# Patient Record
Sex: FEMALE | Race: WHITE
[De-identification: names, ages, dates, MRNs, and addresses within clinical notes are randomized per-mention and may not be internally consistent; named-entity substitution may affect disease eponyms.]

---

## 2017-05-26 ENCOUNTER — HOSPITAL ENCOUNTER (OUTPATIENT)
Dept: HOSPITAL 45 - C.LABPBG | Age: 62
Discharge: HOME | End: 2017-05-26
Attending: FAMILY MEDICINE
Payer: COMMERCIAL

## 2017-05-26 DIAGNOSIS — Z00.00: Primary | ICD-10-CM

## 2017-05-26 LAB
ALBUMIN/GLOB SERPL: 1.1 {RATIO} (ref 0.9–2)
ALP SERPL-CCNC: 89 U/L (ref 45–117)
ALT SERPL-CCNC: 40 U/L (ref 12–78)
ANION GAP SERPL CALC-SCNC: 4 MMOL/L (ref 3–11)
AST SERPL-CCNC: 21 U/L (ref 15–37)
BASOPHILS # BLD: 0.01 K/UL (ref 0–0.2)
BASOPHILS NFR BLD: 0.2 %
BUN SERPL-MCNC: 16 MG/DL (ref 7–18)
BUN/CREAT SERPL: 27.1 (ref 10–20)
CALCIUM SERPL-MCNC: 8.8 MG/DL (ref 8.5–10.1)
CHLORIDE SERPL-SCNC: 109 MMOL/L (ref 98–107)
CHOLEST/HDLC SERPL: 2.8 {RATIO}
CO2 SERPL-SCNC: 29 MMOL/L (ref 21–32)
COMPLETE: YES
CREAT SERPL-MCNC: 0.59 MG/DL (ref 0.6–1.2)
EOSINOPHIL NFR BLD AUTO: 246 K/UL (ref 130–400)
GLOBULIN SER-MCNC: 3.7 GM/DL (ref 2.5–4)
GLUCOSE SERPL-MCNC: 89 MG/DL (ref 70–99)
GLUCOSE UR QL: 61 MG/DL
HCT VFR BLD CALC: 39.8 % (ref 37–47)
IG%: 0.2 %
IMM GRANULOCYTES NFR BLD AUTO: 33.9 %
KETONES UR QL STRIP: 89 MG/DL
LYMPHOCYTES # BLD: 1.71 K/UL (ref 1.2–3.4)
MCH RBC QN AUTO: 30.3 PG (ref 25–34)
MCHC RBC AUTO-ENTMCNC: 32.2 G/DL (ref 32–36)
MCV RBC AUTO: 94.3 FL (ref 80–100)
MONOCYTES NFR BLD: 11.3 %
NEUTROPHILS # BLD AUTO: 3.2 %
NEUTROPHILS NFR BLD AUTO: 51.2 %
NITRITE UR QL STRIP: 90 MG/DL (ref 0–150)
PH UR: 168 MG/DL (ref 0–200)
PMV BLD AUTO: 9.8 FL (ref 7.4–10.4)
POTASSIUM SERPL-SCNC: 4.4 MMOL/L (ref 3.5–5.1)
RBC # BLD AUTO: 4.22 M/UL (ref 4.2–5.4)
SODIUM SERPL-SCNC: 142 MMOL/L (ref 136–145)
TSH SERPL-ACNC: 0.72 UIU/ML (ref 0.3–4.5)
VERY LOW DENSITY LIPOPROT CALC: 18 MG/DL
WBC # BLD AUTO: 5.04 K/UL (ref 4.8–10.8)

## 2017-09-01 ENCOUNTER — HOSPITAL ENCOUNTER (OUTPATIENT)
Dept: HOSPITAL 45 - C.PAPS | Age: 62
Discharge: HOME | End: 2017-09-01
Attending: OBSTETRICS & GYNECOLOGY
Payer: COMMERCIAL

## 2017-09-01 DIAGNOSIS — Z12.4: Primary | ICD-10-CM

## 2018-01-14 ENCOUNTER — HOSPITAL ENCOUNTER (EMERGENCY)
Dept: HOSPITAL 45 - C.EDB | Age: 63
Discharge: HOME | End: 2018-01-14
Payer: COMMERCIAL

## 2018-01-14 VITALS
BODY MASS INDEX: 28.44 KG/M2 | HEIGHT: 62.01 IN | WEIGHT: 156.53 LBS | HEIGHT: 62.01 IN | BODY MASS INDEX: 28.44 KG/M2 | WEIGHT: 156.53 LBS

## 2018-01-14 VITALS — HEART RATE: 62 BPM | SYSTOLIC BLOOD PRESSURE: 112 MMHG | DIASTOLIC BLOOD PRESSURE: 77 MMHG | OXYGEN SATURATION: 97 %

## 2018-01-14 VITALS — TEMPERATURE: 97.52 F | OXYGEN SATURATION: 97 %

## 2018-01-14 DIAGNOSIS — Z79.899: ICD-10-CM

## 2018-01-14 DIAGNOSIS — Z90.89: ICD-10-CM

## 2018-01-14 DIAGNOSIS — E03.9: ICD-10-CM

## 2018-01-14 DIAGNOSIS — R07.2: Primary | ICD-10-CM

## 2018-01-14 DIAGNOSIS — Z86.11: ICD-10-CM

## 2018-01-14 DIAGNOSIS — E78.5: ICD-10-CM

## 2018-01-14 DIAGNOSIS — Z90.710: ICD-10-CM

## 2018-01-14 DIAGNOSIS — Z79.82: ICD-10-CM

## 2018-01-14 LAB
ALBUMIN SERPL-MCNC: 3.8 GM/DL (ref 3.4–5)
ALP SERPL-CCNC: 81 U/L (ref 45–117)
ALT SERPL-CCNC: 26 U/L (ref 12–78)
AST SERPL-CCNC: 18 U/L (ref 15–37)
BASOPHILS # BLD: 0.02 K/UL (ref 0–0.2)
BASOPHILS NFR BLD: 0.3 %
BUN SERPL-MCNC: 17 MG/DL (ref 7–18)
CALCIUM SERPL-MCNC: 8.8 MG/DL (ref 8.5–10.1)
CK MB SERPL-MCNC: 1.6 NG/ML (ref 0.5–3.6)
CO2 SERPL-SCNC: 28 MMOL/L (ref 21–32)
CREAT SERPL-MCNC: 0.64 MG/DL (ref 0.6–1.2)
EOS ABS #: 0.1 K/UL (ref 0–0.5)
EOSINOPHIL NFR BLD AUTO: 203 K/UL (ref 130–400)
GLUCOSE SERPL-MCNC: 110 MG/DL (ref 70–99)
HCT VFR BLD CALC: 40.2 % (ref 37–47)
HGB BLD-MCNC: 13.2 G/DL (ref 12–16)
IG#: 0.01 K/UL (ref 0–0.02)
IMM GRANULOCYTES NFR BLD AUTO: 34.7 %
INR PPP: 1 (ref 0.9–1.1)
LIPASE: 117 U/L (ref 73–393)
LYMPHOCYTES # BLD: 2.06 K/UL (ref 1.2–3.4)
MCH RBC QN AUTO: 30.8 PG (ref 25–34)
MCHC RBC AUTO-ENTMCNC: 32.8 G/DL (ref 32–36)
MCV RBC AUTO: 93.9 FL (ref 80–100)
MONO ABS #: 0.71 K/UL (ref 0.11–0.59)
MONOCYTES NFR BLD: 12 %
NEUT ABS #: 3.04 K/UL (ref 1.4–6.5)
NEUTROPHILS # BLD AUTO: 1.7 %
NEUTROPHILS NFR BLD AUTO: 51.1 %
PMV BLD AUTO: 9.5 FL (ref 7.4–10.4)
POTASSIUM SERPL-SCNC: 4.2 MMOL/L (ref 3.5–5.1)
PROT SERPL-MCNC: 7.7 GM/DL (ref 6.4–8.2)
PTT PATIENT: 26.1 SECONDS (ref 21–31)
RED CELL DISTRIBUTION WIDTH CV: 12.5 % (ref 11.5–14.5)
RED CELL DISTRIBUTION WIDTH SD: 42.6 FL (ref 36.4–46.3)
SODIUM SERPL-SCNC: 139 MMOL/L (ref 136–145)
WBC # BLD AUTO: 5.94 K/UL (ref 4.8–10.8)

## 2018-01-14 NOTE — DIAGNOSTIC IMAGING REPORT
CHEST ONE VIEW PORTABLE



HISTORY: Atypical  CHEST PAIN



COMPARISON: Chest 3/14/16



FINDINGS: The lungs are clear. Cardiac silhouette is normal in size. No pleural

effusions. No pneumothorax.



IMPRESSION:

No acute process.







Electronically signed by:  Joe Elkins M.D.

1/14/2018 9:43 AM



Dictated Date/Time:  1/14/2018 9:41 AM

## 2018-01-14 NOTE — EMERGENCY ROOM VISIT NOTE
History


Report prepared by Charlie:  Damon Pfeiffer


Under the Supervision of:  Dr. Ravi Ayala M.D.


First contact with patient:  08:39


Chief Complaint:  CHEST PAIN


Stated Complaint:  CHEST PAIN,ARM PAIN


Nursing Triage Summary:  


pt reports mid chest pain since 2200 last night.  pt reports taking antacids 


with minimal relief.  "i didn't sleep it kept me up all night."  pt reports 

pain 


radiates into right arm.





History of Present Illness


The patient is a 62 year old female who presents to the Emergency Room with 

complaints of burning centralized chest pain that began 10 hours ago. She rates 

her pain an 8/10 in severity. She has a past medical history of hyperlipidemia, 

GERD,  tuberculosis when she was a child. Her pain is radiating down her right 

arm to her elbow. She notes that she took some antacids with some mild relief. 

She has an extensive family history of cardiac disease. She states that 

recently she has felt some shortness of breath with exertion and is having 

increased gas production with belching. Pt denies LOC, headache, fevers, chills

, diaphoresis, visual changes, neck pain, tearing pain radiating to the back, 

personal history or family history of aneurysm or pulmonary embolism, 

uncontrolled hypertension, leg swelling, coagulation abnormalities, prolonged 

travel, recent surgery or immobilization, nausea, vomiting, abdominal pain, 

melena, hematochezia, urinary symptoms, numbness, weakness, lymphadenopathy, 

rash, or other complaints.





   Source of History:  patient


   Onset:  10 hours ago


   Position:  chest (centralized)


   Symptom Intensity:  8/10


   Quality:  burning


   Timing:  constant


Note:


She is experiencing increased gas production with belching. Her pain is 

radiating into her right arm to her elbow.





Review of Systems


See HPI for pertinent positives and negatives.  A total of ten systems were 

reviewed and were otherwise negative.





Past Medical & Surgical


Medical Problems:


(1) Bladder prolapse 


(2) Gastroesophageal reflux disease


(3) Hyperlipidemia


(4) Hypothyroidism


(5) Hysterectomy


(6) left knee sugery 


(7) Rectocele


(8) right knee surgery


(9) sinus surgery


(10) Tonsillectomy








Family History





Heart disease





Social History


Smoking Status:  Never Smoker


Alcohol Use:  none


Marital Status:  


Occupation Status:  employed





Current/Historical Medications


Scheduled


Apple Cider Vinegar (Apple Cider Vinegar), 450 MG PO DAILY


Ascorbic Acid (Vitamin C), 500 MG PO QPM


Aspirin Enteric Coated (Ecotrin Or Generic), 81 MG PO DAILY


Levothyroxine Sodium (Synthroid), 88 MCG PO DAILY


Probiotic Product (Probiotic), 1 TAB PO QPM


Simvastatin (Zocor), 20 MG PO QPM





Allergies


Coded Allergies:  


     Latex2 -Systemic Allergic Response (Verified  Allergy, Severe, RASH, 

SWELLING OF THROAT, 1/14/18)


     Hydrocodone (Verified  Allergy, Intermediate, ITCHING, 1/14/18)


     Caffeine (Verified  Allergy, Unknown, MILD SOB, 1/14/18)


 INCLUDES TEAS, CHCOLOATE


 USUALLY DRINKS 1 CUP TEA A DAY


     Salicylates (Verified  Adverse Reaction, Unknown, gi upset-TAKES DAILY 

ASPIRIN 81 MG, 1/14/18)





Physical Exam


Vital Signs











  Date Time  Temp Pulse Resp B/P (MAP) Pulse Ox O2 Delivery O2 Flow Rate FiO2


 


1/14/18 12:35  62 18 112/77 97   


 


1/14/18 12:29  55      


 


1/14/18 11:07  64 18 116/67 96 Room Air  


 


1/14/18 10:09  76 18 121/69 94 Room Air  


 


1/14/18 08:51  53 18 138/63 96 Room Air  


 


1/14/18 08:21 36.4 57 18 159/79 97 Room Air  


 


1/14/18 08:21  60      


 


1/14/18 08:21     97 Room Air  











Physical Exam


GENERAL: Awake, alert, uncomfortable appearing, in no distress


HENT: Normocephalic, atraumatic. Oropharynx unremarkable.


EYES: Normal conjunctiva. Sclera non-icteric.


NECK: Supple. No nuchal rigidity. FROM. No JVD.


RESPIRATORY: Clear to auscultation.


CARDIAC: Regular rate, normal rhythm. Extremities warm and well perfused. 

Pulses equal.


ABDOMEN: Soft, non-distended. Mild epigastric tenderness to palpation. No 

rebound or guarding. No masses.


RECTAL: Deferred.


MUSCULOSKELETAL: Chest examination reveals no tenderness. The back is 

symmetrical on inspection without obvious abnormality. There is no CVA 

tenderness to palpation. No joint edema. 


LOWER EXTREMITIES: Calves are equal size bilaterally and non-tender. No edema. 

No discoloration. 


NEURO: Normal sensorium. No sensory or motor deficits noted. 


SKIN: No rash or jaundice noted.





Medical Decision & Procedures


ER Provider


Diagnostic Interpretation:


Radiology results as stated below per my review and radiologist interpretation: 





CHEST ONE VIEW PORTABLE





HISTORY: Atypical  CHEST PAIN





COMPARISON: Chest 3/14/16





FINDINGS: The lungs are clear. Cardiac silhouette is normal in size. No pleural


effusions. No pneumothorax.





IMPRESSION:


No acute process.





Electronically signed by:  Joe Elkins M.D.


1/14/2018 9:43 AM





Dictated Date/Time:  1/14/2018 9:41 AM





Laboratory Results


1/14/18 08:25








Red Blood Count 4.28, Mean Corpuscular Volume 93.9, Mean Corpuscular Hemoglobin 

30.8, Mean Corpuscular Hemoglobin Concent 32.8, Mean Platelet Volume 9.5, 

Neutrophils (%) (Auto) 51.1, Lymphocytes (%) (Auto) 34.7, Monocytes (%) (Auto) 

12.0, Eosinophils (%) (Auto) 1.7, Basophils (%) (Auto) 0.3, Neutrophils # (Auto

) 3.04, Lymphocytes # (Auto) 2.06, Monocytes # (Auto) 0.71, Eosinophils # (Auto

) 0.10, Basophils # (Auto) 0.02





1/14/18 08:25

















Test


  1/14/18


08:25 1/14/18


10:18


 


White Blood Count


  5.94 K/uL


(4.8-10.8) 


 


 


Red Blood Count


  4.28 M/uL


(4.2-5.4) 


 


 


Hemoglobin


  13.2 g/dL


(12.0-16.0) 


 


 


Hematocrit 40.2 % (37-47)  


 


Mean Corpuscular Volume


  93.9 fL


() 


 


 


Mean Corpuscular Hemoglobin


  30.8 pg


(25-34) 


 


 


Mean Corpuscular Hemoglobin


Concent 32.8 g/dl


(32-36) 


 


 


Platelet Count


  203 K/uL


(130-400) 


 


 


Mean Platelet Volume


  9.5 fL


(7.4-10.4) 


 


 


Neutrophils (%) (Auto) 51.1 %  


 


Lymphocytes (%) (Auto) 34.7 %  


 


Monocytes (%) (Auto) 12.0 %  


 


Eosinophils (%) (Auto) 1.7 %  


 


Basophils (%) (Auto) 0.3 %  


 


Neutrophils # (Auto)


  3.04 K/uL


(1.4-6.5) 


 


 


Lymphocytes # (Auto)


  2.06 K/uL


(1.2-3.4) 


 


 


Monocytes # (Auto)


  0.71 K/uL


(0.11-0.59) 


 


 


Eosinophils # (Auto)


  0.10 K/uL


(0-0.5) 


 


 


Basophils # (Auto)


  0.02 K/uL


(0-0.2) 


 


 


RDW Standard Deviation


  42.6 fL


(36.4-46.3) 


 


 


RDW Coefficient of Variation


  12.5 %


(11.5-14.5) 


 


 


Immature Granulocyte % (Auto) 0.2 %  


 


Immature Granulocyte # (Auto)


  0.01 K/uL


(0.00-0.02) 


 


 


Prothrombin Time


  10.6 SECONDS


(9.0-12.0) 


 


 


Prothromb Time International


Ratio 1.0 (0.9-1.1) 


  


 


 


Activated Partial


Thromboplast Time 26.1 SECONDS


(21.0-31.0) 


 


 


Partial Thromboplastin Ratio 1.0  


 


D-Dimer


  260 ug/L FEU


(0-500) 


 


 


Anion Gap


  5.0 mmol/L


(3-11) 


 


 


Est Creatinine Clear Calc


Drug Dose 85.2 ml/min 


  


 


 


Estimated GFR (


American) 111.6 


  


 


 


Estimated GFR (Non-


American 96.3 


  


 


 


BUN/Creatinine Ratio 26.6 (10-20)  


 


Calcium Level


  8.8 mg/dl


(8.5-10.1) 


 


 


Total Bilirubin


  0.4 mg/dl


(0.2-1) 


 


 


Direct Bilirubin


  0.1 mg/dl


(0-0.2) 


 


 


Aspartate Amino Transf


(AST/SGOT) 18 U/L (15-37) 


  


 


 


Alanine Aminotransferase


(ALT/SGPT) 26 U/L (12-78) 


  


 


 


Alkaline Phosphatase


  81 U/L


() 


 


 


Total Creatine Kinase


  95 U/L


() 


 


 


Creatine Kinase MB


  1.6 ng/ml


(0.5-3.6) 


 


 


Creatine Kinase MB Ratio 1.7 (0-3.0)  


 


Troponin I


  < 0.015 ng/ml


(0-0.045) 


 


 


Total Protein


  7.7 gm/dl


(6.4-8.2) 


 


 


Albumin


  3.8 gm/dl


(3.4-5.0) 


 


 


Lipase


  117 U/L


() 


 


 


Bedside Troponin I


  


  < 0.030 ng/ml


(0-0.045)





Laboratory results reviewed by me





Medications Administered











 Medications


  (Trade)  Dose


 Ordered  Sig/Carline


 Route  Start Time


 Stop Time Status Last Admin


Dose Admin


 


 Al Hydroxide/Mg


 Hydroxide


  (Maalox Susp)  30 ml  STK-MED ONCE


 .ROUTE  1/14/18 09:21


 1/14/18 09:22 DC 1/14/18 09:23


30 ML


 


 Lidocaine HCl


  (Viscous


 Lidocaine 2% Soln)  20 ml  STK-MED ONCE


 .ROUTE  1/14/18 09:21


 1/14/18 09:22 DC 1/14/18 09:23


10 ML











ECG


Indication:  chest pain


Rate (beats per minute):  56


Rhythm:  sinus bradycardia


Findings:  RBBB (incomplete), no acute ischemic change, no ectopy


Change:


Repeat ECG shows sinus bradycardia at 50 bpm.  No evidence of ischemia or 

ectopy.  No significant change from the first.





ED Course


0839: The patient was evaluated in room A4. A complete history and physical 

exam was performed.





0915: Ordered GI Cocktail 24 ml PO





0959: The patient has had resolution of her chest discomfort with the GI 

cocktail. 





1230: I reevaluated the patient. Discussed results and discharge instructions: 

She verbalized understanding and agreement. The patient is ready for discharge.





Medical Decision


Triage Nursing notes reviewed.


The patient's presentation and history were concerning for chest pain.





Etiologies such as cardiac ischemia, gastrointestinal, aortic dissection, 

pulmonary embolism, pneumonia, pneumothorax, musculoskeletal, infections,  as 

well as others were entertained.    





The patient was evaluated.  She described a lot of belching and heartburn-like 

symptoms.  She was previously on Prilosec but noted that this made her 

nauseated.  she was given a GI cocktail.  Blood work and ECG were performed.  

These were unremarkable.  On reassessment she had resolution of pain after the 

GI cocktail.  She felt much better.  She was observed.  Repeat troponin and ECG 

were negative for acute change.  Given the situation I discussed conservative 

management with close outpatient follow-up.  At the time of the second troponin 

she had more than 14 hours from the onset of her symptoms.  If she worsens in 

any way she will be back.  I did discuss trying an H2 blocker as well as when 

necessary Mylanta/Maalox.  I gave my usual and customary discussion regarding 

this issue.





By the evaluation outlined above other emergent etiologies such as those listed 

in the differential, as well as others, were deemed relatively unlikely.  





The patient was educated about the findings as listed above.  All questions 

were answered and the patient was pleased with the treatment.  Return 

instructions were outlined and the patient was discharged in stable condition.  





The patient was referred to her pcp for follow-up for a recheck of the current 

condition.





Medication Reconcilliation


Current Medication List:  was personally reviewed by me





Blood Pressure Screening


Patient's blood pressure:  Normal blood pressure


Blood pressure disposition:  Did not require urgent referral





Impression





 Primary Impression:  


 Substernal chest pain





Scribe Attestation


The scribe's documentation has been prepared under my direction and personally 

reviewed by me in its entirety. I confirm that the note above accurately 

reflects all work, treatment, procedures, and medical decision making performed 

by me.





Departure Information


Dispostion


Home / Self-Care





Referrals


Morris Bryan D.O. (PCP)





Forms


Call Back Authorization, HOME CARE DOCUMENTATION FORM,                         

                                       IMPORTANT VISIT INFORMATION





Patient Instructions


My [facility]





Additional Instructions





CHEST PAIN INSTRUCTIONS:





Maalox or Mylanta as needed for any reflux.  Follow the instructions on the 

bottle.





Zantac 150 mg twice daily.  This is available over-the-counter.  Take this 

until directed otherwise by your primary physician.





Acetaminophen(Tylenol) may be used for fever or pain.  Use 1000mg every six 

hours as needed.  Avoid using more than 4000mg in a 24 hour period.  





Rest and drink plenty of fluids as tolerated.





Continue current medications.





Baby aspirin, 81 mg daily until directed otherwise by primary physician.





Avoid strenuous activities and anything that worsens your pain.  Resume normal 

activities once your symptoms resolve.    





Return to the ER immediately for worsening or persistent chest pain, abdominal 

pain, vomiting, fevers, chest pains, difficulty breathing, worsening of your 

condition, or as needed.





Follow up with your primary physician tomorrow to schedule a recheck this week.

## 2018-05-01 ENCOUNTER — HOSPITAL ENCOUNTER (OUTPATIENT)
Dept: HOSPITAL 45 - C.LABPBG | Age: 63
Discharge: HOME | End: 2018-05-01
Attending: FAMILY MEDICINE
Payer: COMMERCIAL

## 2018-05-01 DIAGNOSIS — Z00.00: Primary | ICD-10-CM

## 2018-05-01 DIAGNOSIS — E78.5: ICD-10-CM

## 2018-05-01 DIAGNOSIS — E03.9: ICD-10-CM

## 2018-05-01 LAB
ALBUMIN SERPL-MCNC: 3.5 GM/DL (ref 3.4–5)
ALP SERPL-CCNC: 94 U/L (ref 45–117)
ALT SERPL-CCNC: 38 U/L (ref 12–78)
AST SERPL-CCNC: 20 U/L (ref 15–37)
BASOPHILS # BLD: 0.01 K/UL (ref 0–0.2)
BASOPHILS NFR BLD: 0.2 %
BUN SERPL-MCNC: 14 MG/DL (ref 7–18)
CALCIUM SERPL-MCNC: 8.6 MG/DL (ref 8.5–10.1)
CO2 SERPL-SCNC: 30 MMOL/L (ref 21–32)
CREAT SERPL-MCNC: 0.76 MG/DL (ref 0.6–1.2)
EOS ABS #: 0.1 K/UL (ref 0–0.5)
EOSINOPHIL NFR BLD AUTO: 227 K/UL (ref 130–400)
GLUCOSE SERPL-MCNC: 95 MG/DL (ref 70–99)
HCT VFR BLD CALC: 39.2 % (ref 37–47)
HGB BLD-MCNC: 12.9 G/DL (ref 12–16)
IG#: 0.01 K/UL (ref 0–0.02)
IMM GRANULOCYTES NFR BLD AUTO: 34.9 %
KETONES UR QL STRIP: 71 MG/DL
LYMPHOCYTES # BLD: 1.82 K/UL (ref 1.2–3.4)
MCH RBC QN AUTO: 30.6 PG (ref 25–34)
MCHC RBC AUTO-ENTMCNC: 32.9 G/DL (ref 32–36)
MCV RBC AUTO: 93.1 FL (ref 80–100)
MONO ABS #: 0.56 K/UL (ref 0.11–0.59)
MONOCYTES NFR BLD: 10.7 %
NEUT ABS #: 2.72 K/UL (ref 1.4–6.5)
NEUTROPHILS # BLD AUTO: 1.9 %
NEUTROPHILS NFR BLD AUTO: 52.1 %
PH UR: 148 MG/DL (ref 0–200)
PMV BLD AUTO: 9.5 FL (ref 7.4–10.4)
POTASSIUM SERPL-SCNC: 4.2 MMOL/L (ref 3.5–5.1)
PROT SERPL-MCNC: 7.4 GM/DL (ref 6.4–8.2)
RED CELL DISTRIBUTION WIDTH CV: 12.6 % (ref 11.5–14.5)
RED CELL DISTRIBUTION WIDTH SD: 42.6 FL (ref 36.4–46.3)
SODIUM SERPL-SCNC: 141 MMOL/L (ref 136–145)
WBC # BLD AUTO: 5.22 K/UL (ref 4.8–10.8)